# Patient Record
Sex: FEMALE | Race: WHITE | NOT HISPANIC OR LATINO | URBAN - METROPOLITAN AREA
[De-identification: names, ages, dates, MRNs, and addresses within clinical notes are randomized per-mention and may not be internally consistent; named-entity substitution may affect disease eponyms.]

---

## 2023-07-29 ENCOUNTER — APPOINTMENT (OUTPATIENT)
Dept: RADIOLOGY | Facility: MEDICAL CENTER | Age: 53
End: 2023-07-29
Attending: EMERGENCY MEDICINE
Payer: MEDICAID

## 2023-07-29 ENCOUNTER — HOSPITAL ENCOUNTER (EMERGENCY)
Facility: MEDICAL CENTER | Age: 53
End: 2023-07-29
Attending: EMERGENCY MEDICINE
Payer: MEDICAID

## 2023-07-29 VITALS
WEIGHT: 155.42 LBS | DIASTOLIC BLOOD PRESSURE: 65 MMHG | HEART RATE: 74 BPM | BODY MASS INDEX: 24.98 KG/M2 | RESPIRATION RATE: 18 BRPM | OXYGEN SATURATION: 92 % | TEMPERATURE: 98.3 F | SYSTOLIC BLOOD PRESSURE: 109 MMHG | HEIGHT: 66 IN

## 2023-07-29 DIAGNOSIS — S16.1XXA STRAIN OF NECK MUSCLE, INITIAL ENCOUNTER: ICD-10-CM

## 2023-07-29 DIAGNOSIS — M25.511 ACUTE PAIN OF RIGHT SHOULDER: ICD-10-CM

## 2023-07-29 PROCEDURE — 700101 HCHG RX REV CODE 250: Performed by: EMERGENCY MEDICINE

## 2023-07-29 PROCEDURE — 20552 NJX 1/MLT TRIGGER POINT 1/2: CPT

## 2023-07-29 PROCEDURE — 700111 HCHG RX REV CODE 636 W/ 250 OVERRIDE (IP): Mod: JZ | Performed by: EMERGENCY MEDICINE

## 2023-07-29 PROCEDURE — 96372 THER/PROPH/DIAG INJ SC/IM: CPT

## 2023-07-29 PROCEDURE — 72040 X-RAY EXAM NECK SPINE 2-3 VW: CPT

## 2023-07-29 PROCEDURE — 99284 EMERGENCY DEPT VISIT MOD MDM: CPT

## 2023-07-29 PROCEDURE — 73030 X-RAY EXAM OF SHOULDER: CPT | Mod: RT

## 2023-07-29 RX ORDER — HYDROMORPHONE HYDROCHLORIDE 1 MG/ML
0.5 INJECTION, SOLUTION INTRAMUSCULAR; INTRAVENOUS; SUBCUTANEOUS ONCE
Status: COMPLETED | OUTPATIENT
Start: 2023-07-29 | End: 2023-07-29

## 2023-07-29 RX ORDER — LIDOCAINE HYDROCHLORIDE AND EPINEPHRINE BITARTRATE 20; .01 MG/ML; MG/ML
10 INJECTION, SOLUTION SUBCUTANEOUS ONCE
Status: COMPLETED | OUTPATIENT
Start: 2023-07-29 | End: 2023-07-29

## 2023-07-29 RX ORDER — KETOROLAC TROMETHAMINE 30 MG/ML
15 INJECTION, SOLUTION INTRAMUSCULAR; INTRAVENOUS ONCE
Status: COMPLETED | OUTPATIENT
Start: 2023-07-29 | End: 2023-07-29

## 2023-07-29 RX ORDER — OXYCODONE HYDROCHLORIDE AND ACETAMINOPHEN 5; 325 MG/1; MG/1
1 TABLET ORAL EVERY 4 HOURS PRN
Qty: 8 TABLET | Refills: 0 | Status: SHIPPED | OUTPATIENT
Start: 2023-07-29 | End: 2023-07-31

## 2023-07-29 RX ADMIN — HYDROMORPHONE HYDROCHLORIDE 0.5 MG: 1 INJECTION, SOLUTION INTRAMUSCULAR; INTRAVENOUS; SUBCUTANEOUS at 07:23

## 2023-07-29 RX ADMIN — KETOROLAC TROMETHAMINE 15 MG: 30 INJECTION, SOLUTION INTRAMUSCULAR; INTRAVENOUS at 07:22

## 2023-07-29 RX ADMIN — LIDOCAINE HYDROCHLORIDE,EPINEPHRINE BITARTRATE 10 ML: 20; .01 INJECTION, SOLUTION INFILTRATION; PERINEURAL at 07:30

## 2023-07-29 NOTE — ED NOTES
Pt rounding upon return from, increased movement noted, able to nod head up and down. Denies further needs

## 2023-07-29 NOTE — ED PROVIDER NOTES
ED Provider Note    CHIEF COMPLAINT  No chief complaint on file.      EXTERNAL RECORDS REVIEWED  Other none available    HPI/ROS  LIMITATION TO HISTORY   Select: : None  OUTSIDE HISTORIAN(S):  None    Osman Carolina is a 53 y.o. female who presents with right shoulder pain that started last night.  Patient notes she was walking down the stairs behind an elderly friend when he tripped, lost his balance, and appeared as though he was going to fall.  She jumped down the stairs that  them and grabbed him.  She is uncertain what happened at that point but she had immediate pain in the right side of her neck and shoulder.  The pain is worse with movement of the affected extremity as well as touching the neck and arm.  The pain worsened overnight and ultimately prompted her to come to the ER for evaluation.  She has not taken any medication for her symptoms.  She denies any numbness in the arm.    PAST MEDICAL HISTORY       SURGICAL HISTORY  patient denies any surgical history    FAMILY HISTORY  No family history on file.    SOCIAL HISTORY  Social History     Tobacco Use    Smoking status: Not on file    Smokeless tobacco: Not on file   Substance and Sexual Activity    Alcohol use: Not on file    Drug use: Not on file    Sexual activity: Not on file       CURRENT MEDICATIONS  Home Medications    **Home medications have not yet been reviewed for this encounter**         ALLERGIES  Not on File    PHYSICAL EXAM  VITAL SIGNS: There were no vitals taken for this visit.   Physical Exam  Vitals and nursing note reviewed.   Constitutional:       Appearance: Normal appearance.   HENT:      Head: Normocephalic and atraumatic.      Right Ear: External ear normal.      Left Ear: External ear normal.      Nose: Nose normal.      Mouth/Throat:      Mouth: Mucous membranes are moist.      Pharynx: Oropharynx is clear.   Eyes:      Extraocular Movements: Extraocular movements intact.      Conjunctiva/sclera: Conjunctivae normal.       Pupils: Pupils are equal, round, and reactive to light.   Neck:      Comments: No bony tenderness.  Tenderness along the right cervical paraspinal musculature extending to the right trapezius muscles.  Cardiovascular:      Rate and Rhythm: Normal rate.      Pulses: Normal pulses.      Comments: 2+ right radial pulse.  Pulmonary:      Effort: Pulmonary effort is normal.   Musculoskeletal:      Cervical back: Normal range of motion and neck supple.      Comments: Decreased range of motion at right shoulder due to pain.  Tender over anterior and posterior aspect of the right shoulder as well as the deltoid and superior portion of the affected shoulder.  No overlying erythema or warmth.  No obvious deformity.   Skin:     General: Skin is warm and dry.   Neurological:      General: No focal deficit present.      Mental Status: She is alert and oriented to person, place, and time.      Comments: Full sensation in right upper extremity.   Psychiatric:      Comments: Uncomfortable appearing but pleasant and cooperative.       DIAGNOSTIC STUDIES / PROCEDURES  RADIOLOGY  I have independently interpreted the diagnostic imaging associated with this visit and am waiting the final reading from the radiologist.   My preliminary interpretation is as follows: No fracture  Radiologist interpretation:   DX-CERVICAL SPINE-2 OR 3 VIEWS   Final Result      Normal exam.      DX-SHOULDER 2+ RIGHT   Final Result      No acute abnormality.        TRIGGER POINT INJECTION  Verbal consent was obtained.  Patient was placed in the appropriate position.  The skin over the areas of maximal point tenderness was cleansed with alcohol.  Approximately 1 cc of 1% lidocaine with epinephrine was instilled via 27-gauge needle in each of the 4 areas of maximal tenderness.  The patient tolerated the procedure well and there were no immediate complications.    COURSE & MEDICAL DECISION MAKING    ED Observation Status? No; Patient does not meet criteria for ED  Observation.     INITIAL ASSESSMENT, COURSE AND PLAN  Care Narrative: This is a 53-year-old female who is here with right neck and shoulder pain after an injury she sustained as detailed in the HPI above.    Differential diagnosis includes, but is not limited to, herniated disc, muscle strain, fracture, dislocation.  Arrives slightly hypertensive but afebrile with otherwise normal vital signs.  Appears well-hydrated and nontoxic although is obviously in discomfort secondary to pain.  She does not want to move the right shoulder due to pain but there is no warmth, erythema over the shoulder and symptoms are not consistent with septic arthritis given the history.  She is neurovascularly intact in the right upper extremity.  There is no cervical spine tenderness but there is significant right-sided paraspinal musculature tenderness with reported pain when she turns her head to the right.  There is tenderness along the right trapezius musculature, right deltoid, and into the anterior aspect of the right neck and chest.  There is no obvious deformity.  My suspicion is a muscle strain but we will obtain x-rays to rule out bony abnormality.  Patient was given intramuscular pain medication while waiting imaging results.    Thankfully x-rays are without acute bony abnormality.  Patient was reevaluated at bedside.  She notes that she has had significant improvement in her pain but still does not want to move her right shoulder because of discomfort.  We will place her in a sling and I provided her with range of motion exercises in order to prevent adhesive capsulitis.  She is from [place], I recommended she follow-up with orthopedic surgery when she returns home.  While here, she should come back to the ER if she develops any new or worsening symptoms.  She was discharged in good and stable condition with strict return precautions, instructions for RICE therapy, and small prescription for pain medication.    HTN/IDDM FOLLOW  UP:  The patient is referred to a primary physician for blood pressure management, diabetic screening, and for all other preventive health concerns    ADDITIONAL PROBLEM LIST  None  DISPOSITION AND DISCUSSIONS  I have discussed management of the patient with the following physicians and WILL's: None    Discussion of management with other [facility] or appropriate source(s): None     Escalation of care considered, and ultimately not performed:IV fluids, blood analysis, and acute inpatient care management, however at this time, the patient is most appropriate for outpatient management    Barriers to care at this time, including but not limited to: Patient does not have established PCP.     Decision tools and prescription drugs considered including, but not limited to: Pain Medications -Percocet .    In prescribing controlled substances to this patient, I certify that I have obtained and reviewed the medical history of Osman Carolina. I have also made a good ugo effort to obtain applicable records from other providers who have treated the patient and records did not demonstrate any increased risk of substance abuse that would prevent me from prescribing controlled substances.     I have conducted a physical exam and documented it. I have reviewed Ms. Carolina’s prescription history as maintained by the [place] Prescription Monitoring Program.     I have assessed the patient’s risk for abuse, dependency, and addiction using the validated Opioid Risk Tool available at https://www.mdcalc.com/nokeon-hych-sviq-ort-narcotic-abuse.     Given the above, I believe the benefits of controlled substance therapy outweigh the risks. The reasons for prescribing controlled substances include non-narcotic, oral analgesic alternatives have been inadequate for pain control. Accordingly, I have discussed the risk and benefits, treatment plan, and alternative therapies with the patient.     FINAL DIAGNOSIS  1. Strain of neck muscle, initial encounter     2. Acute pain of right shoulder      Electronically signed by: Dash Winchester M.D., 7/29/2023 6:57 AM

## 2023-07-29 NOTE — DISCHARGE INSTRUCTIONS
You were seen in the ER for right shoulder pain and right-sided neck pain after an injury last night.  Thankfully, your x-rays did not reveal acute abnormality.  You received some medication today that can make you sleepy so please do not drink alcohol or drive for the next 6 hours.  I have sent you home with a prescription for pain medication, take it only as directed.  If you take the medication do not drink alcohol or drive after taking it as it can make you sleepy.  I recommend using ibuprofen/anti-inflammatories as well.  We placed you in a shoulder sling for comfort, please come out of this multiple times per day to perform the range of motion exercises that I showed you in the ER.  Follow-up with orthopedic surgery when you return home to [place].  If you remain here I have given you the contact information for our on-call orthopedic surgeon above and I recommend you call them on Monday to schedule follow-up appointment.  Return with any new or worsening symptoms.  I hope you feel better soon!

## 2023-07-29 NOTE — ED NOTES
Pt to er with c/o rt shoulder and neck pain after trying to aid family member from falling last night. No prior treatment-hot pack provided in er, erp to bedside

## 2023-07-29 NOTE — ED NOTES
Dc instructions reviewed with pt and spouse. Aware of need to  meds at [facility] on [address] and take as directed and only as needed. To f/u with ortho, ice and remove sling with rom freg, return for worsening s/s